# Patient Record
Sex: MALE | Race: WHITE | NOT HISPANIC OR LATINO
[De-identification: names, ages, dates, MRNs, and addresses within clinical notes are randomized per-mention and may not be internally consistent; named-entity substitution may affect disease eponyms.]

---

## 2022-05-31 NOTE — TELEPHONE ENCOUNTER
Last seen: 03/22/2021 by Dr. Sutton  Next appt: None    Was the patient seen in the last year in this department? Yes   Does patient have an active prescription for medications requested? No   Received Request Via: Pharmacy

## 2022-06-01 RX ORDER — BICTEGRAVIR SODIUM, EMTRICITABINE, AND TENOFOVIR ALAFENAMIDE FUMARATE 50; 200; 25 MG/1; MG/1; MG/1
TABLET ORAL
Qty: 30 TABLET | Refills: 3 | Status: SHIPPED | OUTPATIENT
Start: 2022-06-01 | End: 2022-06-29 | Stop reason: SDUPTHER

## 2022-06-14 ENCOUNTER — TELEPHONE (OUTPATIENT)
Dept: INTERNAL MEDICINE | Facility: OTHER | Age: 52
End: 2022-06-14

## 2022-06-14 DIAGNOSIS — B20 HIV DISEASE (HCC): Primary | ICD-10-CM

## 2022-06-14 NOTE — TELEPHONE ENCOUNTER
1. Caller Name: Chace Haskins                        Call Back Number: [phone]        Pt [facility] stating he last saw Dr. Sutton last year 03/2021 and would like to know if Dr. Sutton would like to see him for a yearly follow up? Also any labs he should get done before appointment.     Dr. Sutton please advise     I pl;aced blood and urine tests orders  Routine visit needed  thanks

## 2022-06-29 ENCOUNTER — OFFICE VISIT (OUTPATIENT)
Dept: INTERNAL MEDICINE | Facility: OTHER | Age: 52
End: 2022-06-29
Payer: COMMERCIAL

## 2022-06-29 VITALS
DIASTOLIC BLOOD PRESSURE: 97 MMHG | OXYGEN SATURATION: 98 % | SYSTOLIC BLOOD PRESSURE: 150 MMHG | WEIGHT: 212.2 LBS | TEMPERATURE: 98.2 F | HEART RATE: 97 BPM

## 2022-06-29 DIAGNOSIS — I10 PRIMARY HYPERTENSION: ICD-10-CM

## 2022-06-29 DIAGNOSIS — B20 HIV DISEASE (HCC): ICD-10-CM

## 2022-06-29 PROCEDURE — 99213 OFFICE O/P EST LOW 20 MIN: CPT | Performed by: INTERNAL MEDICINE

## 2022-06-29 RX ORDER — DOXYCYCLINE HYCLATE 100 MG/1
CAPSULE ORAL
COMMUNITY
Start: 2022-06-24 | End: 2023-04-24

## 2022-06-29 RX ORDER — BICTEGRAVIR SODIUM, EMTRICITABINE, AND TENOFOVIR ALAFENAMIDE FUMARATE 50; 200; 25 MG/1; MG/1; MG/1
1 TABLET ORAL DAILY
Qty: 30 TABLET | Refills: 11 | Status: SHIPPED | OUTPATIENT
Start: 2022-06-29 | End: 2023-04-24 | Stop reason: SDUPTHER

## 2022-06-29 RX ORDER — AMLODIPINE BESYLATE 5 MG/1
TABLET ORAL
COMMUNITY
Start: 2022-04-28

## 2022-06-29 RX ORDER — LOSARTAN POTASSIUM AND HYDROCHLOROTHIAZIDE 25; 100 MG/1; MG/1
TABLET ORAL
COMMUNITY
Start: 2022-05-21

## 2022-06-29 RX ORDER — ROSUVASTATIN CALCIUM 40 MG/1
TABLET, COATED ORAL
COMMUNITY
Start: 2022-05-21

## 2022-06-29 NOTE — PROGRESS NOTES
Established Patient    Chace presents today with the following:    CC: new onset rash. Referred to me by Dr. Spicer    HPI: 51 year old male seen for evaluation o a new rash  Hx as previouslt summarized in  visit. HIV infected since 2005, initially treated in [place] with Atripla and then Symfi, when came here in  switched to Biktarvy. Remains with full compliance, always non-detectable. Is  and monogamous for 21 years with his HIV negative . No complications of his HIV. Up to date on all vaccines including 4 COVID ones    He is a retired , moved from [facility] in 2019 . Does some traveling     Was doing well until last week when he woke with pain/swelling around left ankle and a rash developed that spread yo both legs, was initially quite red, but over last 2 or 3 days has started to fade. Does have some edema, no bleeding or pus, no insect bites, no ticks, does go for walks . No travel . not ill. No fever     Social History     Socioeconomic History   • Marital status:         There are no problems to display for this patient.      Current Outpatient Medications   Medication Sig Dispense Refill   • amLODIPine (NORVASC) 5 MG Tab      • doxycycline (VIBRAMYCIN) 100 MG Cap TAKE 1 CAPSULE BY MOUTH TWICE DAILY FOR 21 DAYS     • losartan-hydrochlorothiazide (HYZAAR) 100-25 MG per tablet      • rosuvastatin (CRESTOR) 40 MG tablet      • bictegravir-emtricitab-TAF (BIKTARVY) -25 mg Tab tablet Take 1 Tablet by mouth every day. 30 Tablet 11     No current facility-administered medications for this visit.       ROS: As per HPI. Additional pertinent systems as noted below.  Constitutional: Negative for chills and fever.   espiratory: Negative for cough, hemoptysis, wheezing and stridor.    Cardiovascular: Negative for chest pain, palpitations and leg swelling.   Gastrointestinal: Negative for abdominal pain, constipation, diarrhea, heartburn, nausea and  vomiting.   Genitourinary: Negative for dysuria, flank pain and hematuria.   Musculoskeletal: Negative for falls and myalgias.   Skin: see above  Neurological: Negative for dizziness, seizures, loss of consciousness and headaches.   Endo/Heme/Allergies: Negative for polydipsia. Does not bruise/bleed easily.   .     BP (!) 150/97 (BP Location: Right arm, Patient Position: Sitting, BP Cuff Size: Adult)   Pulse 97   Temp 36.8 °C (98.2 °F) (Temporal)   Wt 96.3 kg (212 lb 3.2 oz)   SpO2 98%     Physical Exam   Constitutional:  oriented to person, place, and time. No distress.   Eyes: Pupils are equal, round, and reactive to light. No scleral icterus.  Neck: Neck supple. No thyromegaly present.   Cardiovascular: Normal rate, regular rhythm and normal heart sounds.  Exam reveals no gallop and no friction rub.  No murmur heard.  Pulmonary/Chest: Breath sounds normal. Chest wall is not dull to percussion.   Musculoskeletal: mild bilateral  LE edema   Lymphadenopathy: no cervical adenopathy  Neurological: alert and oriented to person, place, and time.   Skin: No cyanosis. Nails show no clubbing.. No navarrete rash or rash on soles of feet  Has confluient, non- petechial, not palpable, erythematous rash half way up both cslves. No rash seen anywhere else  On body     LABS- 6/24/22  [facility]    CRP 15   RPR- negative  WBC- 5,300  Normal diff  Platelets -230,000  CD4- 445  (26%)  HIV RNA- not detectable  BUN/cr- 13/1.0  Alb 4.9  AST/ALT--38/44  T.Bili 0,7          Note: I have reviewed all pertinent labs and diagnostic tests associated with this visit with specific comments listed under the assessment and plan below    Assessment and Plan  Rash- suspect viral axanthum nothing to  Suggest syphilis,  Tick born illness  With nl transaminases , nl  Platelet and non- petechial rash, this is  Not strep cellulitis,  Suspect viral rash that is resolving.   At this time can stop doxycycline, aim for symptomatic relief with topical  hydrocortisone and Eucerin  Will call  To check on in a few days  HIV- doing great, will renew Biktarvy, see yearly or as needed     There are no diagnoses linked to this encounter.    Followup: No follow-ups on file.      Signed by: Wm Sutton M.D.

## 2023-04-12 ENCOUNTER — TELEPHONE (OUTPATIENT)
Dept: INTERNAL MEDICINE | Facility: OTHER | Age: 53
End: 2023-04-12
Payer: COMMERCIAL

## 2023-04-12 DIAGNOSIS — B20 HIV DISEASE (HCC): ICD-10-CM

## 2023-04-12 NOTE — TELEPHONE ENCOUNTER
VOICEMAIL  1. Caller Name: Chace Haskins                      Call Back Number: [phone]    2. Message: pt is due for annual HIV visit, please place labs so I can get him scheduled.    3. Patient approves office to leave a detailed voicemail/MyChart message: yes    I placed orders for him- no need to fast   Schedule for follow-up appt

## 2023-04-13 NOTE — TELEPHONE ENCOUNTER
Wm Sutton M.D.  You 14 hours ago (5:55 PM)     CK  I placed orders for non-fasting labs. Get done at least a week before appt, Thanks

## 2023-04-14 DIAGNOSIS — B20 HIV DISEASE (HCC): ICD-10-CM

## 2023-04-14 NOTE — TELEPHONE ENCOUNTER
Pt will be going to [facility] on [address] to get RPR and GC Urine since we have the other results through his PCP

## 2023-04-24 ENCOUNTER — OFFICE VISIT (OUTPATIENT)
Dept: INTERNAL MEDICINE | Facility: OTHER | Age: 53
End: 2023-04-24
Payer: COMMERCIAL

## 2023-04-24 VITALS
WEIGHT: 203.6 LBS | HEIGHT: 72 IN | DIASTOLIC BLOOD PRESSURE: 95 MMHG | SYSTOLIC BLOOD PRESSURE: 142 MMHG | HEART RATE: 102 BPM | OXYGEN SATURATION: 98 % | TEMPERATURE: 98.3 F | BODY MASS INDEX: 27.58 KG/M2

## 2023-04-24 DIAGNOSIS — B20 HIV DISEASE (HCC): ICD-10-CM

## 2023-04-24 PROCEDURE — 99213 OFFICE O/P EST LOW 20 MIN: CPT | Performed by: INTERNAL MEDICINE

## 2023-04-24 RX ORDER — BICTEGRAVIR SODIUM, EMTRICITABINE, AND TENOFOVIR ALAFENAMIDE FUMARATE 50; 200; 25 MG/1; MG/1; MG/1
1 TABLET ORAL DAILY
Qty: 30 TABLET | Refills: 11 | Status: SHIPPED | OUTPATIENT
Start: 2023-04-24 | End: 2024-03-11 | Stop reason: SDUPTHER

## 2023-04-24 NOTE — PROGRESS NOTES
Established Patient    Chace presents today with the following:    CC: HIV follow-up     HPI: 52 year old male seen for routine HIV care follow-up.  Hx as previously summarized in  visit. HIV infected since 2005, initially treated in [place] with Atripla and then Symfi, when came here in  switched to Biktarvy. Remains with full compliance, always non-detectable. Is  and monogamous for 21 years with his HIV negative . No complications of his HIV. Up to date on all vaccines including 4 COVID ones.  Primary care doctor is Flaco Spicer M.D.  Last seen here for a rash that resolved.      He is a retired , moved from [facility] in 2019 . Does some traveling   Also taking care of his Mom who is getting chemo for uterine cancer    Social History     Socioeconomic History    Marital status:         Patient Active Problem List    Diagnosis Date Noted    HIV disease (HCC) 2022    Hypertension 2022       Current Outpatient Medications   Medication Sig Dispense Refill    bictegravir-emtricitab-TAF (BIKTARVY) -25 mg Tab tablet Take 1 Tablet by mouth every day. 30 Tablet 11    amLODIPine (NORVASC) 5 MG Tab       losartan-hydrochlorothiazide (HYZAAR) 100-25 MG per tablet       rosuvastatin (CRESTOR) 40 MG tablet        No current facility-administered medications for this visit.       ROS: As per HPI. Additional pertinent systems as noted below.  Constitutional: Negative for chills and fever.   Respiratory: Negative for cough, hemoptysis, wheezing and stridor.    Cardiovascular: Negative for chest pain, palpitations and leg swelling.   Gastrointestinal: Negative for abdominal pain, constipation, diarrhea, heartburn, nausea and vomiting.   Genitourinary: Negative for dysuria, flank pain and hematuria.   Endo/Heme/Allergies: Negative for polydipsia. Does not bruise/bleed easily.     BP (!) 142/95 (BP Location: Left arm, Patient Position: Sitting, BP Cuff  Size: Adult)   Pulse (!) 102   Temp 36.8 °C (98.3 °F) (Temporal)   Ht 1.829 m (6')   Wt 92.4 kg (203 lb 9.6 oz)   SpO2 98%   BMI 27.61 kg/m²     Physical Exam   Constitutional:  oriented to person, place, and time. No distress.   Eyes: Pupils are equal, round, and reactive to light. No scleral icterus.  Cardiovascular: Normal rate, regular rhythm and normal heart sounds.  Exam reveals no gallop and no friction rub.  No murmur heard.  Pulmonary/Chest: Breath sounds normal. Chest wall is not dull to percussion.   Musculoskeletal:   no edema.   Lymphadenopathy: no cervical adenopathy  Neurological: alert and oriented to person, place, and time.   Skin: No cyanosis. Nails show no clubbing.   LABS:   Latest Reference Range & Units Most Recent   Chlamydia Trachomatis, GIA Negative  Negative  4/19/23 08:34   N. Gonorrhoeae GIA Negative  Negative  4/19/23 08:34   Rapid Plasma Reagin -Rpr- Non Reactive  Non Reactive  4/19/23 08:34     [facility]:4/11/23    CD4 count  427 (26%)  HIV RNA-  not detected  CBC- nl  BUN/Cr- 11/1      glucose -96   alb - 4.6   AST/ALT- 50/59  Chol 213   HDL  61       LDL-  136      Note: I have reviewed all pertinent labs and diagnostic tests associated with this visit with specific comments listed under the assessment and plan below    Assessment and Plan  1. HIV disease (HCC)- Continues to do quite well, tolerating Biktarvy without side effects, reviewed excellent results with him, particularly HIV not detectable.       Will continue present regimen, encouraged him to get ShingRx vaccine . Will see yearly, refilled Biktarvy for one year.   All questions answered         Followup: No follow-ups on file.      Signed by: Wm Sutton M.D.

## 2024-03-11 ENCOUNTER — TELEPHONE (OUTPATIENT)
Dept: INTERNAL MEDICINE | Facility: OTHER | Age: 54
End: 2024-03-11
Payer: COMMERCIAL

## 2024-03-11 RX ORDER — BICTEGRAVIR SODIUM, EMTRICITABINE, AND TENOFOVIR ALAFENAMIDE FUMARATE 50; 200; 25 MG/1; MG/1; MG/1
1 TABLET ORAL DAILY
Qty: 30 TABLET | Refills: 11 | Status: SHIPPED | OUTPATIENT
Start: 2024-03-11

## 2024-03-11 NOTE — TELEPHONE ENCOUNTER
Phone Number Called: [phone] (home)       Call outcome: Spoke to patient regarding message below.    Message: Pt needs their Biktarvy sent over to the [facility] due to insurance change. Pt will call back to schedule appt with Dr. Sutton

## 2024-03-11 NOTE — TELEPHONE ENCOUNTER
Patient called requesting he get in contact with  his MA. I transferred patient to MA's phone. He needed medication help/ questions answered.   New pharmacy did not transfer his prescriptions so he needs new rx for meds.  Prescription assistance program.

## 2024-03-12 NOTE — TELEPHONE ENCOUNTER
Phone Number Called: [phone] (home)       Call outcome: Left detailed message for patient. Informed to call back with any additional questions.    Message: Pt was informed that medication was sent to pharmacy and to call back for any further questions for follow up.

## 2024-04-22 ENCOUNTER — OFFICE VISIT (OUTPATIENT)
Dept: INTERNAL MEDICINE | Facility: OTHER | Age: 54
End: 2024-04-22
Payer: COMMERCIAL

## 2024-04-22 VITALS
BODY MASS INDEX: 28.09 KG/M2 | HEIGHT: 72 IN | TEMPERATURE: 97.7 F | OXYGEN SATURATION: 97 % | SYSTOLIC BLOOD PRESSURE: 140 MMHG | HEART RATE: 118 BPM | WEIGHT: 207.4 LBS | DIASTOLIC BLOOD PRESSURE: 85 MMHG

## 2024-04-22 DIAGNOSIS — B20 HIV DISEASE (HCC): Primary | ICD-10-CM

## 2024-04-22 PROBLEM — E78.5 HYPERLIPIDEMIA: Status: ACTIVE | Noted: 2020-04-20

## 2024-04-22 PROCEDURE — 3077F SYST BP >= 140 MM HG: CPT | Performed by: INTERNAL MEDICINE

## 2024-04-22 PROCEDURE — 3079F DIAST BP 80-89 MM HG: CPT | Performed by: INTERNAL MEDICINE

## 2024-04-22 PROCEDURE — 99213 OFFICE O/P EST LOW 20 MIN: CPT | Performed by: INTERNAL MEDICINE

## 2024-07-01 ENCOUNTER — HOSPITAL ENCOUNTER (OUTPATIENT)
Dept: LAB | Facility: MEDICAL CENTER | Age: 54
End: 2024-07-01
Attending: INTERNAL MEDICINE
Payer: COMMERCIAL

## 2024-07-01 DIAGNOSIS — B20 HIV DISEASE (HCC): ICD-10-CM

## 2024-07-01 PROCEDURE — 86361 T CELL ABSOLUTE COUNT: CPT

## 2024-07-01 PROCEDURE — 36415 COLL VENOUS BLD VENIPUNCTURE: CPT

## 2024-07-01 PROCEDURE — 87536 HIV-1 QUANT&REVRSE TRNSCRPJ: CPT

## 2024-07-03 LAB
CD3+CD4+ CELLS # BLD: 419 CELLS/UL (ref 430–1800)
CD3+CD4+ CELLS NFR BLD: 25 % (ref 32–64)
IMMUNODEFICIENCY MARKERS SPEC-IMP: ABNORMAL

## 2024-07-04 LAB
HIV-1 NAAT (COPIES/ML) L204479A: ABNORMAL CPY/ML
HIV-1 NAAT (LOG COPIES/ML) L295410: <1.47 LOG CPY/ML
HIV1 RNA SERPL QL NAA+PROBE: DETECTED